# Patient Record
Sex: FEMALE | Race: OTHER | URBAN - METROPOLITAN AREA
[De-identification: names, ages, dates, MRNs, and addresses within clinical notes are randomized per-mention and may not be internally consistent; named-entity substitution may affect disease eponyms.]

---

## 2021-12-09 ENCOUNTER — APPOINTMENT (RX ONLY)
Dept: URBAN - METROPOLITAN AREA CLINIC 26 | Facility: CLINIC | Age: 46
Setting detail: DERMATOLOGY
End: 2021-12-09

## 2021-12-09 DIAGNOSIS — L65.9 NONSCARRING HAIR LOSS, UNSPECIFIED: ICD-10-CM

## 2021-12-09 PROCEDURE — ? OTHER

## 2021-12-09 PROCEDURE — ? TREATMENT REGIMEN

## 2021-12-09 PROCEDURE — ? ADDITIONAL NOTES

## 2021-12-09 PROCEDURE — 99203 OFFICE O/P NEW LOW 30 MIN: CPT

## 2021-12-09 PROCEDURE — ? COUNSELING

## 2021-12-09 PROCEDURE — ? ORDER TESTS

## 2021-12-09 ASSESSMENT — LOCATION ZONE DERM: LOCATION ZONE: SCALP

## 2021-12-09 ASSESSMENT — LOCATION SIMPLE DESCRIPTION DERM: LOCATION SIMPLE: LEFT SCALP

## 2021-12-09 ASSESSMENT — LOCATION DETAILED DESCRIPTION DERM: LOCATION DETAILED: LEFT MEDIAL FRONTAL SCALP

## 2021-12-09 NOTE — PROCEDURE: ORDER TESTS
Billing Type: Third-Party Bill
Expected Date Of Service: 12/09/2021
Bill For Surgical Tray: no
Performing Laboratory: [phone]

## 2021-12-09 NOTE — PROCEDURE: OTHER
Other (Free Text): Patient states she has never met her father and therefore is not familiar with her complete family history of hair loss. Patient has noted diffuse hair loss for several years that has worsened in the last few months. 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 Patient has a long history of anxiety although she does not note any recent psychological or physical stressors in the last 2-6 months. Patient is wearing hair in tight braid today, more apparent hair thinning toward frontal scalp, possible traction alopecia component. Discussed performing biopsy today for additional information but patient has strong needle phobia and would like to avoid if at all possible. \\n\\nRecommended wearing hair in loose melvi. Patient will start minoxidil 5% BID.
Note Text (......Xxx Chief Complaint.): This diagnosis correlates with the
Detail Level: Simple
Render Risk Assessment In Note?: no